# Patient Record
Sex: FEMALE | Race: WHITE | HISPANIC OR LATINO | URBAN - METROPOLITAN AREA
[De-identification: names, ages, dates, MRNs, and addresses within clinical notes are randomized per-mention and may not be internally consistent; named-entity substitution may affect disease eponyms.]

---

## 2022-09-07 ENCOUNTER — OFFICE VISIT (OUTPATIENT)
Dept: URBAN - METROPOLITAN AREA CLINIC 72 | Facility: CLINIC | Age: 37
End: 2022-09-07
Payer: COMMERCIAL

## 2022-09-07 ENCOUNTER — DASHBOARD ENCOUNTERS (OUTPATIENT)
Age: 37
End: 2022-09-07

## 2022-09-07 ENCOUNTER — WEB ENCOUNTER (OUTPATIENT)
Dept: URBAN - METROPOLITAN AREA CLINIC 72 | Facility: CLINIC | Age: 37
End: 2022-09-07

## 2022-09-07 ENCOUNTER — LAB OUTSIDE AN ENCOUNTER (OUTPATIENT)
Dept: URBAN - METROPOLITAN AREA CLINIC 72 | Facility: CLINIC | Age: 37
End: 2022-09-07

## 2022-09-07 VITALS
BODY MASS INDEX: 45.99 KG/M2 | HEART RATE: 82 BPM | DIASTOLIC BLOOD PRESSURE: 101 MMHG | HEIGHT: 67 IN | SYSTOLIC BLOOD PRESSURE: 165 MMHG | TEMPERATURE: 98.6 F | WEIGHT: 293 LBS

## 2022-09-07 DIAGNOSIS — K62.5 BRBPR (BRIGHT RED BLOOD PER RECTUM): ICD-10-CM

## 2022-09-07 PROCEDURE — 99204 OFFICE O/P NEW MOD 45 MIN: CPT | Performed by: INTERNAL MEDICINE

## 2022-09-07 RX ORDER — LISINOPRIL AND HYDROCHLOROTHIAZIDE 20; 12.5 MG/1; MG/1
1 TABLET TABLET ORAL ONCE A DAY
Status: ACTIVE | COMMUNITY

## 2022-09-07 NOTE — HPI-TODAY'S VISIT:
Mrs. Riley is a pleasant 37-year-old female presents as a new patient for evaluation for rectal bleeding.She reports that she has seen GI in the past for rectal bleeding, was told that she likely had a hemorrhoid and was advised to take fiber which did help with the rectal bleeding.  Unfortunately despite daily fiber usage the bleeding has returned filling the toilet bowl with defecation.  She does not have spontaneous bleeding only post defecation.  She has tried to increase her fiber but has become constipated with it.  She has no family history of colon cancer but has not had a colonoscopy.  She denies any weight loss or other symptoms.

## 2022-09-23 ENCOUNTER — OFFICE VISIT (OUTPATIENT)
Dept: URBAN - METROPOLITAN AREA MEDICAL CENTER 40 | Facility: MEDICAL CENTER | Age: 37
End: 2022-09-23
Payer: COMMERCIAL

## 2022-09-23 DIAGNOSIS — K62.5 ANAL BLEEDING: ICD-10-CM

## 2022-09-23 DIAGNOSIS — K64.4 ANAL SKIN TAG: ICD-10-CM

## 2022-09-23 DIAGNOSIS — K57.30 ACQUIRED DIVERTICULOSIS OF COLON: ICD-10-CM

## 2022-09-23 DIAGNOSIS — K64.0 BLEEDING GRADE I HEMORRHOIDS: ICD-10-CM

## 2022-09-23 PROCEDURE — 45380 COLONOSCOPY AND BIOPSY: CPT | Performed by: INTERNAL MEDICINE

## 2022-10-18 ENCOUNTER — OFFICE VISIT (OUTPATIENT)
Dept: URBAN - METROPOLITAN AREA CLINIC 72 | Facility: CLINIC | Age: 37
End: 2022-10-18

## 2022-10-28 ENCOUNTER — OFFICE VISIT (OUTPATIENT)
Dept: URBAN - METROPOLITAN AREA CLINIC 72 | Facility: CLINIC | Age: 37
End: 2022-10-28